# Patient Record
Sex: FEMALE | URBAN - METROPOLITAN AREA
[De-identification: names, ages, dates, MRNs, and addresses within clinical notes are randomized per-mention and may not be internally consistent; named-entity substitution may affect disease eponyms.]

---
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
## 2024-05-22 ENCOUNTER — APPOINTMENT (OUTPATIENT)
Dept: URBAN - METROPOLITAN AREA CLINIC 309 | Age: 19
Setting detail: DERMATOLOGY
End: 2024-05-22

## 2024-05-22 DIAGNOSIS — L70.0 ACNE VULGARIS: ICD-10-CM

## 2024-05-22 PROCEDURE — OTHER MIPS QUALITY: OTHER

## 2024-05-22 PROCEDURE — OTHER PRESCRIPTION: OTHER

## 2024-05-22 PROCEDURE — OTHER PHOTO-DOCUMENTATION: OTHER

## 2024-05-22 PROCEDURE — OTHER TREATMENT REGIMEN: OTHER

## 2024-05-22 PROCEDURE — OTHER REASSURANCE: OTHER

## 2024-05-22 PROCEDURE — 99214 OFFICE O/P EST MOD 30 MIN: CPT

## 2024-05-22 PROCEDURE — OTHER COUNSELING: OTHER

## 2024-05-22 RX ORDER — TAZAROTENE 1 MG/G
CREAM TOPICAL
Qty: 60 | Refills: 6 | Status: ERX | COMMUNITY
Start: 2024-05-22

## 2024-05-22 RX ORDER — SPIRONOLACTONE 50 MG/1
TABLET, FILM COATED ORAL
Qty: 60 | Refills: 5 | Status: ERX

## 2024-05-22 ASSESSMENT — LOCATION SIMPLE DESCRIPTION DERM
LOCATION SIMPLE: LEFT CHEEK
LOCATION SIMPLE: RIGHT CHEEK

## 2024-05-22 ASSESSMENT — LOCATION DETAILED DESCRIPTION DERM
LOCATION DETAILED: LEFT CENTRAL MALAR CHEEK
LOCATION DETAILED: RIGHT INFERIOR CENTRAL MALAR CHEEK

## 2024-05-22 ASSESSMENT — LOCATION ZONE DERM: LOCATION ZONE: FACE

## 2024-05-22 NOTE — PROCEDURE: TREATMENT REGIMEN
Plan: AM:\\nStep 1: Cleanser\\nStep 2: Moisturizer \\nStep 3: Sunscreen\\n\\nPM:\\nStep 1: Cleanser \\nStep 2: Moisturizer \\nStep 3: tazarotene 0.1 % topical cream Apply pea sized amount to face at night, avoid ocular skin\\nStep 4: Moisturizer
Detail Level: Zone
Initiate Treatment: tazarotene 0.1 % topical cream Apply pea sized amount to face at night, avoid ocular skin
Discontinue Regimen: arazlo 0.045 % lotion
Continue Regimen: spironolactone 50 mg tablet Take 1 tablet by mouth twice daily